# Patient Record
Sex: MALE | Race: WHITE | Employment: FULL TIME | URBAN - METROPOLITAN AREA
[De-identification: names, ages, dates, MRNs, and addresses within clinical notes are randomized per-mention and may not be internally consistent; named-entity substitution may affect disease eponyms.]

---

## 2023-09-27 NOTE — PROGRESS NOTES
[facility]  Consultation Note    Chief Complaint / Reason for Consultation  IVC filter fracture    History of Present Illness  Patient is a 59 y.o. male with remote history of IVC filter placement in 2009 presents today in referral after lumbar x-ray was performed secondary to back pain showing strut fracture of his IVC filter. Overall he is doing well and has no complaints. Review of Systems     Denies fevers, chills, chest pain, shortness of breath, nausea, vomiting, hematemesis, diarrhea, constipation, melena, hematochezia, wt changes, vision problems, blindness, hearing problems, facial droop, slurred speech, extremity weakness, extremity numbness, dysuria. Past Medical History:   has no past medical history on file. Past Surgical History:   has no past surgical history on file. Medications:  No current outpatient medications on file prior to visit. No current facility-administered medications on file prior to visit. Allergies:  Not on File     Social History:        Family History:  family history is not on file. Vital Signs  There were no vitals filed for this visit. Physical Examination  General:  no apparent distress  Psychiatric: affect appropriate  H  Labs  No results found for: \"WBC\", \"HGB\", \"HCT\", \"MCV\", \"PLT\"  No results found for: \"NA\", \"K\", \"CL\", \"CO2\", \"PHOS\", \"BUN\", \"CREATININE\", \"CA\"   No results found for: \"GLU\"    Imaging:     No orders to display     No results found. Assessment:   History of IVC filter with strut fracture      Plan:  67-year-old male with history of IVC filter and a strut fracture. I reassured the patient there was nothing additional that needed to be done. No physical limitations. Would not recommend retrieval.  As its been 13 years. All questions answered. Rina Martinez M.D., FACS.   9/27/2023  4:28 PM

## 2023-10-03 ENCOUNTER — OFFICE VISIT (OUTPATIENT)
Dept: VASCULAR SURGERY | Age: 58
End: 2023-10-03
Payer: COMMERCIAL

## 2023-10-03 VITALS
SYSTOLIC BLOOD PRESSURE: 118 MMHG | WEIGHT: 191 LBS | BODY MASS INDEX: 25.31 KG/M2 | HEIGHT: 73 IN | DIASTOLIC BLOOD PRESSURE: 80 MMHG

## 2023-10-03 DIAGNOSIS — Z95.828 PRESENCE OF IVC FILTER: Primary | ICD-10-CM

## 2023-10-03 PROCEDURE — 99212 OFFICE O/P EST SF 10 MIN: CPT | Performed by: SURGERY

## 2023-10-03 RX ORDER — AMOXICILLIN 500 MG/1
CAPSULE ORAL
COMMUNITY
Start: 2023-09-08